# Patient Record
Sex: MALE | Race: WHITE | URBAN - METROPOLITAN AREA
[De-identification: names, ages, dates, MRNs, and addresses within clinical notes are randomized per-mention and may not be internally consistent; named-entity substitution may affect disease eponyms.]

---

## 2017-02-27 ENCOUNTER — APPOINTMENT (RX ONLY)
Dept: URBAN - METROPOLITAN AREA CLINIC 100 | Facility: CLINIC | Age: 72
Setting detail: DERMATOLOGY
End: 2017-02-27

## 2017-02-27 DIAGNOSIS — L40.0 PSORIASIS VULGARIS: ICD-10-CM | Status: WELL CONTROLLED

## 2017-02-27 DIAGNOSIS — L82.1 OTHER SEBORRHEIC KERATOSIS: ICD-10-CM

## 2017-02-27 PROBLEM — E78.5 HYPERLIPIDEMIA, UNSPECIFIED: Status: ACTIVE | Noted: 2017-02-27

## 2017-02-27 PROBLEM — I25.10 ATHEROSCLEROTIC HEART DISEASE OF NATIVE CORONARY ARTERY WITHOUT ANGINA PECTORIS: Status: ACTIVE | Noted: 2017-02-27

## 2017-02-27 PROCEDURE — ? OTHER

## 2017-02-27 PROCEDURE — 99214 OFFICE O/P EST MOD 30 MIN: CPT

## 2017-02-27 PROCEDURE — ? TREATMENT REGIMEN

## 2017-02-27 PROCEDURE — ? COUNSELING

## 2017-02-27 ASSESSMENT — LOCATION SIMPLE DESCRIPTION DERM: LOCATION SIMPLE: LEFT UPPER BACK

## 2017-02-27 ASSESSMENT — LOCATION DETAILED DESCRIPTION DERM: LOCATION DETAILED: LEFT SUPERIOR MEDIAL UPPER BACK

## 2017-02-27 ASSESSMENT — LOCATION ZONE DERM: LOCATION ZONE: TRUNK

## 2017-02-27 NOTE — PROCEDURE: OTHER
Other (Free Text): Psoriasis is doing well. Continue with Triamcinolone 0.1% cream twice daily x 4 weeks.
Detail Level: Zone
Note Text (......Xxx Chief Complaint.): This diagnosis correlates with the

## 2017-02-27 NOTE — PROCEDURE: TREATMENT REGIMEN
Detail Level: Zone
Action 2: Continue
Treatment 1: Triamcinolone 0.1% cream
Sig For Treatment 1 (If Needed): twice daily

## 2017-02-27 NOTE — PROCEDURE: COUNSELING
Quality 337: Tuberculosis Prevention For Psoriasis And Psoriatic Arthritis Patients On A Biological Immune Response Modifier: Patient has a documented negative annual TB screening.
Detail Level: Zone

## 2017-02-27 NOTE — PROCEDURE: REASSURANCE
Detail Level: Zone
Include Location In Plan?: No
Quality 337: Tuberculosis Prevention For Psoriasis And Psoriatic Arthritis Patients On A Biological Immune Response Modifier: Patient has a documented negative annual TB screening.
Quality 410: Psoriasis Clinical Response To Oral Systemic Or Biologic Medications: Documentation that the patient declined therapy change